# Patient Record
Sex: FEMALE | Race: WHITE | HISPANIC OR LATINO | Employment: STUDENT | URBAN - METROPOLITAN AREA
[De-identification: names, ages, dates, MRNs, and addresses within clinical notes are randomized per-mention and may not be internally consistent; named-entity substitution may affect disease eponyms.]

---
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
## 2025-02-24 ENCOUNTER — RESULTS FOLLOW-UP (OUTPATIENT)
Dept: HEPATOLOGY | Facility: CLINIC | Age: 20
End: 2025-02-24

## 2025-06-23 ENCOUNTER — OFFICE VISIT (OUTPATIENT)
Dept: HEPATOLOGY | Facility: CLINIC | Age: 20
End: 2025-06-23
Payer: COMMERCIAL

## 2025-06-23 ENCOUNTER — TELEPHONE (OUTPATIENT)
Dept: HEPATOLOGY | Facility: CLINIC | Age: 20
End: 2025-06-23

## 2025-06-23 DIAGNOSIS — E66.01 MORBID OBESITY: ICD-10-CM

## 2025-06-23 DIAGNOSIS — R74.8 ELEVATED LIVER ENZYMES: ICD-10-CM

## 2025-06-23 DIAGNOSIS — K75.81 METABOLIC DYSFUNCTION-ASSOCIATED STEATOHEPATITIS (MASH): Primary | ICD-10-CM

## 2025-06-23 DIAGNOSIS — E78.1 HYPERTRIGLYCERIDEMIA: ICD-10-CM

## 2025-06-23 DIAGNOSIS — R16.2 HEPATOSPLENOMEGALY: ICD-10-CM

## 2025-06-23 DIAGNOSIS — K74.01 EARLY HEPATIC FIBROSIS: ICD-10-CM

## 2025-06-23 PROBLEM — E61.1 LOW SERUM IRON: Status: ACTIVE | Noted: 2024-03-08

## 2025-06-23 PROCEDURE — 98006 SYNCH AUDIO-VIDEO EST MOD 30: CPT | Mod: 95,,, | Performed by: NURSE PRACTITIONER

## 2025-06-23 PROCEDURE — 1159F MED LIST DOCD IN RCRD: CPT | Mod: CPTII,95,, | Performed by: NURSE PRACTITIONER

## 2025-06-23 PROCEDURE — 1160F RVW MEDS BY RX/DR IN RCRD: CPT | Mod: CPTII,95,, | Performed by: NURSE PRACTITIONER

## 2025-06-23 RX ORDER — BUSPIRONE HYDROCHLORIDE 5 MG/1
5 TABLET ORAL 2 TIMES DAILY
COMMUNITY
Start: 2025-06-01

## 2025-06-23 NOTE — PROGRESS NOTES
The patient location is: [place]  The chief complaint leading to consultation is: Fatty liver    Visit type: audiovisual    30 minutes of total time spent on the encounter, which includes face to face time and non-face to face time preparing to see the patient (eg, review of tests), Obtaining and/or reviewing separately obtained history, Documenting clinical information in the electronic or other health record, Independently interpreting results (not separately reported) and communicating results to the patient/family/caregiver, or Care coordination (not separately reported).     Each patient to whom he or she provides medical services by telemedicine is:  (1) informed of the relationship between the physician and patient and the respective role of any other health care provider with respect to management of the patient; and (2) notified that he or she may decline to receive medical services by telemedicine and may withdraw from such care at any time.    Notes:      [facility] Established Patient Visit    Reason for Visit:  Fatty Liver/Elevated Liver Enzymes    PCP: Stacey Leslie    HPI:  This is a 20 y.o. female with PMH noted below, here for evaluation of fatty liver and elevated liver enzymes. She is accompanied by her Mother. The  was not available through [employer], and the patient spoke fluent English, and denied that she needed an .    The patient's risk factors for fatty liver disease include:     Overweight/Obesity                     Yes; BMI 40.03 - Weight gain of 30 lbs since 2018.  Dyslipidemia                                Yes; Refer to most recent lipid panel results below:   Latest Reference Range & Units 01/31/24 09:55   Cholesterol Total 120 - 199 mg/dL 184   HDL 40 - 75 mg/dL 36 (L)   HDL/Cholesterol Ratio 20.0 - 50.0 % 19.6 (L)   Non-HDL Cholesterol mg/dL 148   Total Cholesterol/HDL Ratio 2.0 - 5.0  5.1 (H)   Triglycerides 30 - 150 mg/dL 262 (H)   LDL  Cholesterol 0 - 129 mg/dL 114     Insulin resistance/Diabetes         No; Last HgbA1c was 5.4% (11/2024)    She has had elevated liver enzymes in a hepatocellular pattern since at least 7/2021.    Fibroscan to non-invasively stage her liver disease last year was suggestive of cirrhosis, so a MR Elastography was ordered for further evaluation, which was suggestive of F1-F2 fibrosis, and a low likelihood of cirrhosis, as below:    MR Elastography  Narrative: EXAMINATION:  MR ELASTOGRAPHY    CLINICAL HISTORY:  steatosis, liver fibrosis staging;  Nonalcoholic steatohepatitis (HERCULES)    TECHNIQUE:  MRI abdomen performed without contrast per MR elastography protocol.    COMPARISON:  Ultrasound from 02/05/2024    FINDINGS:  Inferior thorax: Normal.    Liver: Liver is enlarged in size measuring 22.8 cm.    Gallbladder: Multiple small gallstones.    Pancreas: Normal.    Spleen: Enlarged in size measuring 13.0 cm.    Kidneys: Normal.    Bowel (visualized portions): Normal.    Liver fat fraction measurements as follows:    LAMONTE--15.8%    Segmentation--17.8%    MR spectroscopy voxel--14.8%    Liver elasticity measures 3.95 kPa consistent with F1 or F2 fibrosis..    Liver R2* value of 39.6 Hz, within normal range. (LIC estimated at 1.267 mg/g). No evidence of iron overload.  Impression: 1.  Liver fat fraction measures 16.1%, consistent with moderate or severe steatosis.    2.  Liver elasticity measures 3.95 kPa consistent with F1 or F2 fibrosis.    3.  Liver R2* value of 39.6 Hz, within normal limits.  No evidence of iron overload.    4.  Hepatosplenomegaly.    5.  Cholelithiasis.    REFERENCE RANGES:    Fat fraction analysis:    <5%: Normal.    5 to 15%: Mild steatosis.    >15%: Moderate or severe steatosis.    Elastography:    <2.93 kPa: Normal or F0 fibrosis.    2.93 to 4.15 kPa: F1 or F2 fibrosis.    >4.15 kPa: F3 or F4 fibrosis.    R2*:    > 65 Hz: Mild iron overload    > 215 Hz: Moderate    > 440 Hz: Severe iron  overload    Estimated LIC=0.032 * R2*    * If steatosis is present, elevated liver stiffness (>2.74 kPa) may be secondary to inflammation (HERCULES).    Electronically signed by: Jt Mcdonald MD  Date:    11/27/2024  Time:    07:23    She has never undergone a liver biopsy.    FIB-4 Calculation: 0.87 at 2/2/2024  9:31 AM  Calculated from:  SGOT/AST: 167 U/L at 1/31/2024  9:55 AM  SGPT/ALT: 107 U/L at 1/31/2024  9:55 AM  Platelets: 335 K/uL at 2/2/2024  9:31 AM  Age: 18 years     FIB-4 below 1.30 is considered as low-risk for advanced fibrosis  FIB-4 over 2.67 is considered as high-risk for advanced fibrosis  FIB-4 values between 1.30 and 2.67 are considered as intermediate-risk of advanced fibrosis for ages 36-64.     For ages > 64 the cut-off for low-risk goes to < 2.  This is a screening tool and clinical judgement should be used in the interpretation of these results.    She has no known family history of liver disease. She denies any history of heavy alcohol intake and does not use illicit drugs. HBV, HCV negative on prior labs. She is immune to Hepatitis A.     LFT's were stable/improved when last checked in February. She is unsure if she has lost any weight. She has been exercising more, but is not tracking calories.    She is well appearing, and has no signs or symptoms of hepatic decompensation including jaundice, dark urine, pruritus, abdominal distention, lower extremity edema, hematemesis, melena, or periods of confusion suggestive of hepatic encephalopathy.      PMHX:  has a past medical history of Von Willebrand disease.    PSHX:  has a past surgical history that includes Knee surgery.    The patient's social and family histories were reviewed by me and updated in the appropriate section of the electronic medical record.    Review of patient's allergies indicates:  No Known Allergies    Current Outpatient Medications on File Prior to Visit   Medication Sig Dispense Refill    busPIRone (BUSPAR) 5 MG Tab  Take 5 mg by mouth 2 (two) times daily.      drospirenone-e.estradioL-lm.FA (BEYAZ/GABRIEL) 3-0.02-0.451 mg (24) (4) Tab       drospirenone-ethinyl estradioL (RENEE) 3-0.02 mg per tablet Take 1 tablet by mouth once daily.      EScitalopram oxalate (LEXAPRO) 20 MG tablet Take 20 mg by mouth.      propranoloL (INDERAL) 10 MG tablet Take 10 mg by mouth 2 (two) times daily. as directed.       No current facility-administered medications on file prior to visit.     SOCIAL HISTORY:   Social History     Tobacco Use   Smoking Status Not on file   Smokeless Tobacco Not on file     Social History     Substance and Sexual Activity   Alcohol Use None     Social History     Substance and Sexual Activity   Drug Use Not on file     ROS:   GENERAL: Denies fever, chills, weight loss/gain, fatigue  HEENT: Denies headaches, dizziness, vision/hearing changes  CARDIOVASCULAR: Denies chest pain, palpitations, or edema  RESPIRATORY: Denies dyspnea, cough  GI: Denies abdominal pain, rectal bleeding, nausea, vomiting. No change in bowel pattern or color  : Denies dysuria, hematuria   SKIN: Denies rash, itching   NEURO: Denies confusion, memory loss, or mood changes  PSYCH: Denies depression or anxiety  HEME/LYMPH: Denies easy bruising or bleeding    Objective Findings:    PHYSICAL EXAM:   Friendly White female, in no acute distress; alert and oriented to person, place and time  VITALS: There were no vitals taken for this visit.  HENT: Normocephalic, without obvious abnormality.   EYES: Sclerae anicteric.   NECK: No obvious masses.  CARDIOVASCULAR: RICHI.  RESPIRATORY: Normal respiratory effort.   GI: RICHI.  EXTREMITIES: RICHI.  SKIN: No jaundice. No rashes noted to exposed skin.   NEURO: No asterixis.  PSYCH:  Memory intact. Thought and speech pattern appropriate. Behavior normal. No depression or anxiety noted.    DIAGNOSTIC STUDIES:    MR Elastography  Narrative: EXAMINATION:  MR ELASTOGRAPHY    CLINICAL HISTORY:  steatosis, liver fibrosis  staging;  Nonalcoholic steatohepatitis (HERCULES)    TECHNIQUE:  MRI abdomen performed without contrast per MR elastography protocol.    COMPARISON:  Ultrasound from 02/05/2024    FINDINGS:  Inferior thorax: Normal.    Liver: Liver is enlarged in size measuring 22.8 cm.    Gallbladder: Multiple small gallstones.    Pancreas: Normal.    Spleen: Enlarged in size measuring 13.0 cm.    Kidneys: Normal.    Bowel (visualized portions): Normal.    Liver fat fraction measurements as follows:    LAMONTE--15.8%    Segmentation--17.8%    MR spectroscopy voxel--14.8%    Liver elasticity measures 3.95 kPa consistent with F1 or F2 fibrosis..    Liver R2* value of 39.6 Hz, within normal range. (LIC estimated at 1.267 mg/g). No evidence of iron overload.  Impression: 1.  Liver fat fraction measures 16.1%, consistent with moderate or severe steatosis.    2.  Liver elasticity measures 3.95 kPa consistent with F1 or F2 fibrosis.    3.  Liver R2* value of 39.6 Hz, within normal limits.  No evidence of iron overload.    4.  Hepatosplenomegaly.    5.  Cholelithiasis.    REFERENCE RANGES:    Fat fraction analysis:    <5%: Normal.    5 to 15%: Mild steatosis.    >15%: Moderate or severe steatosis.    Elastography:    <2.93 kPa: Normal or F0 fibrosis.    2.93 to 4.15 kPa: F1 or F2 fibrosis.    >4.15 kPa: F3 or F4 fibrosis.    R2*:    > 65 Hz: Mild iron overload    > 215 Hz: Moderate    > 440 Hz: Severe iron overload    Estimated LIC=0.032 * R2*    * If steatosis is present, elevated liver stiffness (>2.74 kPa) may be secondary to inflammation (HERCULES).    Electronically signed by: Jt Mcdonald MD  Date:    11/27/2024  Time:    07:23    EDUCATION:  Per AVS.    ASSESSMENT & PLAN:  20 y.o. White female with:    1. Metabolic dysfunction-associated steatohepatitis (MASH)  Hepatic Function Panel    Lipid Panel      2. Early hepatic fibrosis  Hepatic Function Panel    Lipid Panel      3. Hepatosplenomegaly  Hepatic Function Panel    Lipid Panel      4.  Elevated liver enzymes  Hepatic Function Panel    Lipid Panel      5. Morbid obesity  Hepatic Function Panel    Lipid Panel      6. Hypertriglyceridemia  Hepatic Function Panel    Lipid Panel        - Recommend MR Elastography to non-invasively re-stage liver disease every 1-2 years.  - Repeat liver function tests now. Will also recheck lipids.  - Recommend initial weight loss goal of 20 lbs, through diet and exercise.  - Recommend good control of cholesterol, blood pressure, & blood sugar levels.  - Avoid alcohol and herbal supplements/alternative remedies.  - Return to clinic to be determined by lab results, likely 6 months.     Thank you for allowing me to participate in the care of Greg Rizzo       Hepatology Nurse Practitioner  [facility]    CC'ed note to:   No ref. provider found  Stacey Leslie MD

## 2025-06-23 NOTE — TELEPHONE ENCOUNTER
----- Message from Juana Paez NP sent at 6/23/2025 10:21 AM CDT -----  Please contact patient to schedule fasting labs now at [facility] facility in [place]. RTC TBD by lab results. Thanks!

## 2025-06-23 NOTE — Clinical Note
Please contact patient to schedule fasting labs now at [facility] facility in [place]. RTC TBD by lab results. Thanks!

## 2025-06-23 NOTE — TELEPHONE ENCOUNTER
Called patient and scheduled labs appointment as Ms. Juana requested for 6/24/25 at 9:45am at [place] location. Informed patient that all appointment informations she can find at [facility] as well. Patient verbalized that she agree with her appointments.

## 2025-06-24 ENCOUNTER — TELEPHONE (OUTPATIENT)
Dept: HEPATOLOGY | Facility: CLINIC | Age: 20
End: 2025-06-24
Payer: COMMERCIAL

## 2025-06-24 ENCOUNTER — RESULTS FOLLOW-UP (OUTPATIENT)
Dept: HEPATOLOGY | Facility: CLINIC | Age: 20
End: 2025-06-24

## 2025-06-24 DIAGNOSIS — R74.8 ELEVATED LIVER ENZYMES: ICD-10-CM

## 2025-06-24 DIAGNOSIS — E66.01 MORBID OBESITY: ICD-10-CM

## 2025-06-24 DIAGNOSIS — E78.1 HYPERTRIGLYCERIDEMIA: ICD-10-CM

## 2025-06-24 DIAGNOSIS — K75.81 METABOLIC DYSFUNCTION-ASSOCIATED STEATOHEPATITIS (MASH): Primary | ICD-10-CM

## 2025-06-24 DIAGNOSIS — K74.01 EARLY HEPATIC FIBROSIS: ICD-10-CM

## 2025-06-24 DIAGNOSIS — R16.2 HEPATOSPLENOMEGALY: ICD-10-CM

## 2025-06-24 NOTE — TELEPHONE ENCOUNTER
----- Message from Juana Paez NP sent at 6/24/2025 11:49 AM CDT -----  Please contact patient and arrange f/u labs in 3 months, along with a f/u visit with me. Thanks!  ----- Message -----  From: Lab, Background User  Sent: 6/24/2025  10:15 AM CDT  To: Juana Paez NP

## 2025-06-24 NOTE — TELEPHONE ENCOUNTER
Called patient and scheduled f/u labs on 9/24 at 11:20am at [place] location and virtual f/u visit with Ms. Arias on 9/29 at 10:30am. Patient verbalized that she agree with her appointments. Mailed appointments informations.